# Patient Record
Sex: FEMALE | URBAN - METROPOLITAN AREA
[De-identification: names, ages, dates, MRNs, and addresses within clinical notes are randomized per-mention and may not be internally consistent; named-entity substitution may affect disease eponyms.]

---

## 2021-10-26 ENCOUNTER — OFFICE VISIT (OUTPATIENT)
Dept: URBAN - METROPOLITAN AREA CLINIC 32 | Facility: CLINIC | Age: 56
End: 2021-10-26
Payer: COMMERCIAL

## 2021-10-26 DIAGNOSIS — H00.14 CHALAZION LEFT UPPER EYELID: Primary | ICD-10-CM

## 2021-10-26 PROCEDURE — 99203 OFFICE O/P NEW LOW 30 MIN: CPT | Performed by: OPTOMETRIST

## 2021-10-26 RX ORDER — PREDNISOLONE ACETATE 10 MG/ML
1 % SUSPENSION/ DROPS OPHTHALMIC
Qty: 5 | Refills: 1 | Status: ACTIVE
Start: 2021-10-26

## 2021-10-26 RX ORDER — DOXYCYCLINE HYCLATE 100 MG/1
100 MG TABLET, COATED ORAL
Qty: 60 | Refills: 1 | Status: ACTIVE
Start: 2021-10-26

## 2021-10-26 RX ORDER — CIPROFLOXACIN HYDROCHLORIDE 3 MG/ML
0.3 % SOLUTION/ DROPS OPHTHALMIC
Qty: 5 | Refills: 1 | Status: INACTIVE
Start: 2021-10-26 | End: 2021-11-24

## 2021-10-26 ASSESSMENT — INTRAOCULAR PRESSURE
OS: 15
OD: 19

## 2021-10-26 NOTE — IMPRESSION/PLAN
Impression: Chalazion left upper eyelid: H00.14. Plan: Discussed treatment options with patient. Emphasized and explained compliance. Will continue to observe condition and or symptoms. Lid scrubs and hygiene were explained. Patient instructed to apply warm compresses. Patient has been unsuccessful in treating with Doxycycline 100 mg PO BID x 10 days. Recommend restarting Doxycycline 100mg BID PO x 30 days, Ciprofloxacin BID and Prednisolone BID. Patient is here on vacation and lives in [place]. Patient to follow up with Ophthalmologist in [place] in 30 days if no improvement.